# Patient Record
Sex: FEMALE | Race: OTHER | Employment: FULL TIME | URBAN - METROPOLITAN AREA
[De-identification: names, ages, dates, MRNs, and addresses within clinical notes are randomized per-mention and may not be internally consistent; named-entity substitution may affect disease eponyms.]

---

## 2018-01-15 ENCOUNTER — HOSPITAL ENCOUNTER (OUTPATIENT)
Facility: HOSPITAL | Age: 43
Setting detail: OBSERVATION
Discharge: HOME | End: 2018-01-18
Attending: EMERGENCY MEDICINE | Admitting: INTERNAL MEDICINE
Payer: COMMERCIAL

## 2018-01-15 ENCOUNTER — APPOINTMENT (OUTPATIENT)
Dept: CT IMAGING | Facility: HOSPITAL | Age: 43
End: 2018-01-15
Attending: EMERGENCY MEDICINE
Payer: COMMERCIAL

## 2018-01-15 DIAGNOSIS — G45.9 TRANSIENT CEREBRAL ISCHEMIA, UNSPECIFIED TYPE: Primary | ICD-10-CM

## 2018-01-15 LAB
ANION GAP SERPL CALC-SCNC: 6 MMOL/L (ref 0–18)
B-HCG UR QL: NEGATIVE
BASOPHILS # BLD: 0.1 K/UL (ref 0–0.2)
BASOPHILS NFR BLD: 1 %
BILIRUB UR QL: NEGATIVE
BUN SERPL-MCNC: 10 MG/DL (ref 8–20)
BUN/CREAT SERPL: 16.7 (ref 10–20)
CALCIUM SERPL-MCNC: 8.6 MG/DL (ref 8.5–10.5)
CHLORIDE SERPL-SCNC: 106 MMOL/L (ref 95–110)
CLARITY UR: CLEAR
CO2 SERPL-SCNC: 27 MMOL/L (ref 22–32)
COLOR UR: YELLOW
CREAT SERPL-MCNC: 0.6 MG/DL (ref 0.5–1.5)
EOSINOPHIL # BLD: 0.1 K/UL (ref 0–0.7)
EOSINOPHIL NFR BLD: 1 %
ERYTHROCYTE [DISTWIDTH] IN BLOOD BY AUTOMATED COUNT: 13.6 % (ref 11–15)
GLUCOSE SERPL-MCNC: 98 MG/DL (ref 70–99)
GLUCOSE UR-MCNC: NEGATIVE MG/DL
HCT VFR BLD AUTO: 39.1 % (ref 35–48)
HGB BLD-MCNC: 12.9 G/DL (ref 12–16)
HGB UR QL STRIP.AUTO: NEGATIVE
KETONES UR-MCNC: NEGATIVE MG/DL
LEUKOCYTE ESTERASE UR QL STRIP.AUTO: NEGATIVE
LYMPHOCYTES # BLD: 2.7 K/UL (ref 1–4)
LYMPHOCYTES NFR BLD: 35 %
MAGNESIUM SERPL-MCNC: 2.7 MG/DL (ref 1.8–2.5)
MCH RBC QN AUTO: 27.3 PG (ref 27–32)
MCHC RBC AUTO-ENTMCNC: 33 G/DL (ref 32–37)
MCV RBC AUTO: 82.7 FL (ref 80–100)
MONOCYTES # BLD: 0.8 K/UL (ref 0–1)
MONOCYTES NFR BLD: 10 %
NEUTROPHILS # BLD AUTO: 4 K/UL (ref 1.8–7.7)
NEUTROPHILS NFR BLD: 52 %
NITRITE UR QL STRIP.AUTO: NEGATIVE
OSMOLALITY UR CALC.SUM OF ELEC: 287 MOSM/KG (ref 275–295)
PH UR: 6 [PH] (ref 5–8)
PLATELET # BLD AUTO: 313 K/UL (ref 140–400)
PMV BLD AUTO: 7.8 FL (ref 7.4–10.3)
POTASSIUM SERPL-SCNC: 3.7 MMOL/L (ref 3.3–5.1)
PROT UR-MCNC: NEGATIVE MG/DL
RBC # BLD AUTO: 4.73 M/UL (ref 3.7–5.4)
SODIUM SERPL-SCNC: 139 MMOL/L (ref 136–144)
SP GR UR STRIP: 1.02 (ref 1–1.03)
TROPONIN I SERPL-MCNC: 0 NG/ML (ref ?–0.03)
UROBILINOGEN UR STRIP-ACNC: <2
VIT C UR-MCNC: NEGATIVE MG/DL
WBC # BLD AUTO: 7.7 K/UL (ref 4–11)

## 2018-01-15 PROCEDURE — 70450 CT HEAD/BRAIN W/O DYE: CPT | Performed by: EMERGENCY MEDICINE

## 2018-01-15 RX ORDER — ACETAMINOPHEN 325 MG/1
325 TABLET ORAL EVERY 6 HOURS PRN
COMMUNITY

## 2018-01-15 RX ORDER — ASPIRIN 81 MG/1
324 TABLET, CHEWABLE ORAL ONCE
Status: COMPLETED | OUTPATIENT
Start: 2018-01-15 | End: 2018-01-15

## 2018-01-15 NOTE — ED INITIAL ASSESSMENT (HPI)
Pt to ER with c/o right sided headache with \"tingling\" and dizziness since Friday. +blurred vision per patient. Face symmetrical. Boyfriend states she had some \"slurred speech on Saturday\". Pt currently alert and oriented x4. Speech clear in triage.  Pt

## 2018-01-16 ENCOUNTER — APPOINTMENT (OUTPATIENT)
Dept: ULTRASOUND IMAGING | Facility: HOSPITAL | Age: 43
End: 2018-01-16
Attending: INTERNAL MEDICINE
Payer: COMMERCIAL

## 2018-01-16 ENCOUNTER — APPOINTMENT (OUTPATIENT)
Dept: MRI IMAGING | Facility: HOSPITAL | Age: 43
End: 2018-01-16
Attending: Other
Payer: COMMERCIAL

## 2018-01-16 LAB
ANION GAP SERPL CALC-SCNC: 8 MMOL/L (ref 0–18)
BASOPHILS # BLD: 0 K/UL (ref 0–0.2)
BASOPHILS # BLD: 0 K/UL (ref 0–0.2)
BASOPHILS NFR BLD: 0 %
BASOPHILS NFR BLD: 1 %
BUN SERPL-MCNC: 8 MG/DL (ref 8–20)
BUN/CREAT SERPL: 11.6 (ref 10–20)
CALCIUM SERPL-MCNC: 8.6 MG/DL (ref 8.5–10.5)
CHLORIDE SERPL-SCNC: 104 MMOL/L (ref 95–110)
CHOLEST SERPL-MCNC: 191 MG/DL (ref 110–200)
CO2 SERPL-SCNC: 26 MMOL/L (ref 22–32)
CREAT SERPL-MCNC: 0.69 MG/DL (ref 0.5–1.5)
EOSINOPHIL # BLD: 0.1 K/UL (ref 0–0.7)
EOSINOPHIL # BLD: 0.1 K/UL (ref 0–0.7)
EOSINOPHIL NFR BLD: 1 %
EOSINOPHIL NFR BLD: 2 %
ERYTHROCYTE [DISTWIDTH] IN BLOOD BY AUTOMATED COUNT: 13.6 % (ref 11–15)
ERYTHROCYTE [DISTWIDTH] IN BLOOD BY AUTOMATED COUNT: 13.6 % (ref 11–15)
GLUCOSE SERPL-MCNC: 87 MG/DL (ref 70–99)
HCT VFR BLD AUTO: 37.9 % (ref 35–48)
HCT VFR BLD AUTO: 39.1 % (ref 35–48)
HDLC SERPL-MCNC: 44 MG/DL
HGB BLD-MCNC: 12.2 G/DL (ref 12–16)
HGB BLD-MCNC: 12.9 G/DL (ref 12–16)
INR BLD: 1 (ref 0.9–1.2)
LDLC SERPL CALC-MCNC: 126 MG/DL (ref 0–99)
LYMPHOCYTES # BLD: 2.1 K/UL (ref 1–4)
LYMPHOCYTES # BLD: 2.6 K/UL (ref 1–4)
LYMPHOCYTES NFR BLD: 23 %
LYMPHOCYTES NFR BLD: 31 %
MCH RBC QN AUTO: 26.8 PG (ref 27–32)
MCH RBC QN AUTO: 27.4 PG (ref 27–32)
MCHC RBC AUTO-ENTMCNC: 32.3 G/DL (ref 32–37)
MCHC RBC AUTO-ENTMCNC: 33 G/DL (ref 32–37)
MCV RBC AUTO: 83.1 FL (ref 80–100)
MCV RBC AUTO: 83.2 FL (ref 80–100)
MONOCYTES # BLD: 0.7 K/UL (ref 0–1)
MONOCYTES # BLD: 0.8 K/UL (ref 0–1)
MONOCYTES NFR BLD: 10 %
MONOCYTES NFR BLD: 8 %
NEUTROPHILS # BLD AUTO: 5 K/UL (ref 1.8–7.7)
NEUTROPHILS # BLD AUTO: 6.1 K/UL (ref 1.8–7.7)
NEUTROPHILS NFR BLD: 58 %
NEUTROPHILS NFR BLD: 68 %
NONHDLC SERPL-MCNC: 147 MG/DL
OSMOLALITY UR CALC.SUM OF ELEC: 284 MOSM/KG (ref 275–295)
PLATELET # BLD AUTO: 297 K/UL (ref 140–400)
PLATELET # BLD AUTO: 299 K/UL (ref 140–400)
PMV BLD AUTO: 7.7 FL (ref 7.4–10.3)
PMV BLD AUTO: 7.8 FL (ref 7.4–10.3)
POTASSIUM SERPL-SCNC: 3.4 MMOL/L (ref 3.3–5.1)
POTASSIUM SERPL-SCNC: 3.7 MMOL/L (ref 3.3–5.1)
PROTHROMBIN TIME: 13.2 SECONDS (ref 11.8–14.5)
RBC # BLD AUTO: 4.56 M/UL (ref 3.7–5.4)
RBC # BLD AUTO: 4.7 M/UL (ref 3.7–5.4)
SODIUM SERPL-SCNC: 138 MMOL/L (ref 136–144)
TRIGL SERPL-MCNC: 106 MG/DL (ref 1–149)
WBC # BLD AUTO: 8.7 K/UL (ref 4–11)
WBC # BLD AUTO: 8.9 K/UL (ref 4–11)

## 2018-01-16 PROCEDURE — 70544 MR ANGIOGRAPHY HEAD W/O DYE: CPT | Performed by: OTHER

## 2018-01-16 PROCEDURE — 70549 MR ANGIOGRAPH NECK W/O&W/DYE: CPT | Performed by: OTHER

## 2018-01-16 PROCEDURE — 93880 EXTRACRANIAL BILAT STUDY: CPT | Performed by: INTERNAL MEDICINE

## 2018-01-16 PROCEDURE — 70553 MRI BRAIN STEM W/O & W/DYE: CPT | Performed by: OTHER

## 2018-01-16 PROCEDURE — 99244 OFF/OP CNSLTJ NEW/EST MOD 40: CPT | Performed by: OTHER

## 2018-01-16 RX ORDER — POTASSIUM CHLORIDE 20 MEQ/1
40 TABLET, EXTENDED RELEASE ORAL EVERY 4 HOURS
Status: COMPLETED | OUTPATIENT
Start: 2018-01-16 | End: 2018-01-16

## 2018-01-16 RX ORDER — POTASSIUM CHLORIDE 20 MEQ/1
40 TABLET, EXTENDED RELEASE ORAL ONCE
Status: COMPLETED | OUTPATIENT
Start: 2018-01-16 | End: 2018-01-16

## 2018-01-16 RX ORDER — ACETAMINOPHEN 325 MG/1
650 TABLET ORAL EVERY 6 HOURS PRN
Status: DISCONTINUED | OUTPATIENT
Start: 2018-01-16 | End: 2018-01-18

## 2018-01-16 RX ORDER — ASPIRIN 81 MG/1
81 TABLET, CHEWABLE ORAL DAILY
Status: DISCONTINUED | OUTPATIENT
Start: 2018-01-16 | End: 2018-01-18

## 2018-01-16 RX ORDER — HEPARIN SODIUM 5000 [USP'U]/ML
5000 INJECTION, SOLUTION INTRAVENOUS; SUBCUTANEOUS EVERY 12 HOURS SCHEDULED
Status: DISCONTINUED | OUTPATIENT
Start: 2018-01-16 | End: 2018-01-18

## 2018-01-16 NOTE — CONSULTS
Consult dictated. Possible left middle cerebral artery TIA versus complicated migraines. Recommendations– MRI, MR angiogram of the head, neck, 2D echo, coagulopathy workup, lipid panel, 81 mg of aspirin.   At this workup is normal with then proceed with t

## 2018-01-16 NOTE — PLAN OF CARE
NEUROLOGICAL - ADULT    • Achieves stable or improved neurological status Progressing        Patient/Family Goals    • Patient/Family Long Term Goal Progressing    • Patient/Family Short Term Goal Progressing        Vitals stable, oriented x4, neuros intac

## 2018-01-16 NOTE — ED NOTES
Patient reports 2 month history of left-sided facial numbness and headaches that have now subsided. Today the patient complains of right-sided headache, tingling and dizziness with right arm weakness since last Friday.

## 2018-01-16 NOTE — ED PROVIDER NOTES
Patient Seen in: [facility] AND [facility] Emergency Department    History   Patient presents with:  Headache (neurologic)    Stated Complaint: headache since friday, dizzy    HPI    38year old female who has past medical history of migraines but is otherwise h Exam   ED Triage Vitals [01/15/18 1541]  BP: 120/80  Pulse: 75  Resp: 18  Temp: 98.3 °F (36.8 °C)  Temp src: Oral  SpO2: 100 %  O2 Device: None (Room air)    Current:/74   Pulse 64   Temp 98.8 °F (37.1 °C) (Temporal)   Resp 20   Ht 154.9 cm (5' 1\") Normal   EMH POCT PREGNANCY URINE - Normal   CBC WITH DIFFERENTIAL WITH PLATELET    Narrative: The following orders were created for panel order CBC WITH DIFFERENTIAL WITH PLATELET.   Procedure                               Abnormality         Status Patient is out of network, our  was contacted and she did attempt to find patient's PCP at outside hospital, but patient is not eligible for transfer, we will admit her here for further neurologic workup.     Dr [address] - will admit  Dr Jose Maria Adame

## 2018-01-16 NOTE — ED NOTES
Patient is safe to transport to floor per MD. Report given to floor RN, Andria Chinchilla. Transport via cart requested. Cardiac telemetry verified in progress.

## 2018-01-16 NOTE — H&P
[facility]    PATIENT'S NAME: Mary Vasquez PHYSICIAN: Jeremy Crockett MD   PATIENT ACCOUNT#:   [id number]    LOCATION:  [place] [address] RECORD #:   [id number]       YOB: 1975  ADMISSION DATE:       01/15/2018 JVD.  LUNGS:  Good air entry bilaterally. HEART:  S1 and S2 heard normally. ABDOMEN:  Soft, nondistended. Bowel sounds present. EXTREMITIES:  No edema. NEUROLOGIC:  Higher functions are normal.  No focal neurologic deficits.     LABORATORY DATA:  Rev

## 2018-01-16 NOTE — PAYOR COMM NOTE
OBSV STATUS    --------------  ADMISSION REVIEW     Payor: [facility] (NON CONTRACTED IPA)  Subscriber #:  [id number]  Authorization Number: N/A    Admit date: N/A  Admit time: N/A       Admitting Physician: Monica Watson MD  Attending Madhaviy weakness, facial droop, numb/tingling in her extremities, persistent vision change, difficulty swallowing, double to ambulate and, dizziness, abdominal pain, chest pain, shortness of breath.[VR.1]    History reviewed. No pertinent past medical history. time. She has normal strength. She is not disoriented. She displays no tremor. No cranial nerve deficit or sensory deficit. She exhibits normal muscle tone. Coordination normal. GCS eye subscore is 4. GCS verbal subscore is 5. GCS motor subscore is 6.    No intracranial hemorrhage, focal infarct or mass.  Small focus of calcification in the left basal ganglia as discussed above more likely asymmetric basal ganglia physiologic calcification, or possibly dystrophic calcification from a previous parasitic infecti Filed:  1/16/2018 10:37 AM Date of Service:  1/16/2018  8:15 AM Status:  Unsigned Transcription    :  Eddy Milligan MD (Physician)         Misael Guillaume    PATIENT'S NAME: Erlin Murrieta PHYSICIAN: Zahra Delgado MD and alert, not in acute distress. VITAL SIGNS:  Afebrile, blood pressure 120/80, pulse 75. HEENT:  Normocephalic. Pupils reactive to light. NECK:  Supple. No JVD. LUNGS:  Good air entry bilaterally. HEART:  S1 and S2 heard normally.   ABDOMEN:  Soft,

## 2018-01-16 NOTE — CM/SW NOTE
Met with patient at bedside, to inquire name of pcp, pt states does not know her name, patient has only seen pcp x2 and has never been hospitalized.   Pt informed needs admission, patient declines transfer as does not know another hospital.  States has been

## 2018-01-17 ENCOUNTER — APPOINTMENT (OUTPATIENT)
Dept: CV DIAGNOSTICS | Facility: HOSPITAL | Age: 43
End: 2018-01-17
Attending: INTERNAL MEDICINE
Payer: COMMERCIAL

## 2018-01-17 ENCOUNTER — APPOINTMENT (OUTPATIENT)
Dept: CT IMAGING | Facility: HOSPITAL | Age: 43
End: 2018-01-17
Attending: Other
Payer: COMMERCIAL

## 2018-01-17 LAB
ALBUMIN SERPL BCP-MCNC: 3.4 G/DL (ref 3.5–4.8)
ALP SERPL-CCNC: 46 U/L (ref 32–100)
ALT SERPL-CCNC: 15 U/L (ref 14–54)
ANION GAP SERPL CALC-SCNC: 9 MMOL/L (ref 0–18)
APTT PPP: 24.2 SECONDS (ref 23.2–35.3)
AST SERPL-CCNC: 18 U/L (ref 15–41)
B-HCG UR QL: NEGATIVE
BILIRUB DIRECT SERPL-MCNC: 0.1 MG/DL (ref 0–0.2)
BILIRUB SERPL-MCNC: 0.6 MG/DL (ref 0.3–1.2)
BUN SERPL-MCNC: 7 MG/DL (ref 8–20)
BUN/CREAT SERPL: 11.9 (ref 10–20)
CALCIUM SERPL-MCNC: 8.8 MG/DL (ref 8.5–10.5)
CHLORIDE SERPL-SCNC: 103 MMOL/L (ref 95–110)
CO2 SERPL-SCNC: 26 MMOL/L (ref 22–32)
CONFIRM DRVVT: 0.9 S (ref 0–1.1)
CREAT SERPL-MCNC: 0.59 MG/DL (ref 0.5–1.5)
F2 C.20210G>A GENO BLD/T: NORMAL
F5 P.R506Q BLD/T QL: NORMAL
GLUCOSE SERPL-MCNC: 137 MG/DL (ref 70–99)
HBA1C MFR BLD: 5.9 % (ref 4–6)
OSMOLALITY UR CALC.SUM OF ELEC: 286 MOSM/KG (ref 275–295)
POTASSIUM SERPL-SCNC: 4 MMOL/L (ref 3.3–5.1)
POTASSIUM SERPL-SCNC: 4.6 MMOL/L (ref 3.3–5.1)
PROT SERPL-MCNC: 6.7 G/DL (ref 5.9–8.4)
PROTHROMBIN TIME: 13.2 SECONDS (ref 11.8–14.5)
SODIUM SERPL-SCNC: 138 MMOL/L (ref 136–144)

## 2018-01-17 PROCEDURE — 93306 TTE W/DOPPLER COMPLETE: CPT | Performed by: INTERNAL MEDICINE

## 2018-01-17 PROCEDURE — 99226 SUBSEQUENT OBSERVATION CARE: CPT | Performed by: OTHER

## 2018-01-17 RX ORDER — SODIUM CHLORIDE 9 MG/ML
INJECTION, SOLUTION INTRAVENOUS CONTINUOUS
Status: DISCONTINUED | OUTPATIENT
Start: 2018-01-17 | End: 2018-01-18

## 2018-01-17 RX ORDER — ATORVASTATIN CALCIUM 10 MG/1
10 TABLET, FILM COATED ORAL NIGHTLY
Status: DISCONTINUED | OUTPATIENT
Start: 2018-01-17 | End: 2018-01-18

## 2018-01-17 RX ORDER — SODIUM CHLORIDE 0.9 % (FLUSH) 0.9 %
10 SYRINGE (ML) INJECTION AS NEEDED
Status: DISCONTINUED | OUTPATIENT
Start: 2018-01-17 | End: 2018-01-18

## 2018-01-17 NOTE — PROGRESS NOTES
[facility]    Progress Note    Sin Lr Patient Status:  Observation    1975 MRN [id number]   Location [address] [facility] Attending [address], [address] Day # 0 PCP No primary care provider on file.        Subjective 01/16/2018   INR 1.0 01/16/2018   MG 2.7 (H) 01/15/2018   TROP 0.00 01/15/2018       Mra Brain (soa=[id number])    Result Date: 1/16/2018  CONCLUSION:  1. A questionable 2 mm right cavernous carotid artery aneurysm versus artifact related to patient motion.  Kenisha

## 2018-01-17 NOTE — CONSULTS
[facility]    PATIENT'S NAME: Ravi Dennison PHYSICIAN: Mayur Edwards MD   CONSULTING PHYSICIAN: Lila Powell MD   PATIENT ACCOUNT#:   [id number]    LOCATION:  [address] [place] [address] RECORD #:   [id number]       DATE OF BIRTH:  2 to light and accommodation. Optic discs are flat. Visual fields are full. NEUROLOGIC:  Cranial nerves III through VII, IX through XII are normal.  Strength in arms and legs is normal.  Deep tendon reflexes symmetric without Babinski.   Joint position s

## 2018-01-17 NOTE — PLAN OF CARE
NEUROLOGICAL - ADULT    • Achieves stable or improved neurological status Progressing        Patient Centered Care    • Patient preferences are identified and integrated in the patient's plan of care Progressing        Patient/Family Goals    • Patient/Fam

## 2018-01-17 NOTE — PROGRESS NOTES
Discussed trans-esophogeal echocardiogram with the patient and her daughter who translated. The procedure was explained including risks, benefits and alternatives. The patient and her daughter expressed understanding.

## 2018-01-17 NOTE — PROGRESS NOTES
[facility]    Progress Note    Liliana Chandler Patient Status:  Observation    1975 MRN [id number]   Location [address] [facility] Attending [address], [address] Day # 0 PCP No primary care provider on file.        Subjective nocturia  MUSCULOSKELETAL: no joint complaints upper or lower extremities  PSYCHE:no depression or anxiety  NEURO: As in HPI    Results:     Lab Results  Component Value Date   WBC 8.9 01/16/2018   HGB 12.9 01/16/2018   HCT 39.1 01/16/2018    01/16/ Carotid Doppler Bilat - Diag Img (cpt=93880)    Result Date: 1/16/2018  CONCLUSION:  1. Mild atherosclerosis of carotid arteries with minimal intimal thickening bilaterally and minimal soft plaque in the right carotid bulb. No stenosis.  Velocities are in n

## 2018-01-18 ENCOUNTER — APPOINTMENT (OUTPATIENT)
Dept: CT IMAGING | Facility: HOSPITAL | Age: 43
End: 2018-01-18
Attending: Other
Payer: COMMERCIAL

## 2018-01-18 ENCOUNTER — APPOINTMENT (OUTPATIENT)
Dept: INTERVENTIONAL RADIOLOGY/VASCULAR | Facility: HOSPITAL | Age: 43
End: 2018-01-18
Attending: NURSE PRACTITIONER
Payer: COMMERCIAL

## 2018-01-18 ENCOUNTER — APPOINTMENT (OUTPATIENT)
Dept: CV DIAGNOSTICS | Facility: HOSPITAL | Age: 43
End: 2018-01-18
Attending: NURSE PRACTITIONER
Payer: COMMERCIAL

## 2018-01-18 VITALS
DIASTOLIC BLOOD PRESSURE: 66 MMHG | SYSTOLIC BLOOD PRESSURE: 112 MMHG | RESPIRATION RATE: 18 BRPM | TEMPERATURE: 98 F | WEIGHT: 127.5 LBS | OXYGEN SATURATION: 99 % | BODY MASS INDEX: 24.07 KG/M2 | HEIGHT: 61 IN | HEART RATE: 80 BPM

## 2018-01-18 PROCEDURE — 93325 DOPPLER ECHO COLOR FLOW MAPG: CPT | Performed by: NURSE PRACTITIONER

## 2018-01-18 PROCEDURE — 93320 DOPPLER ECHO COMPLETE: CPT | Performed by: NURSE PRACTITIONER

## 2018-01-18 PROCEDURE — B245ZZ4 ULTRASONOGRAPHY OF LEFT HEART, TRANSESOPHAGEAL: ICD-10-PCS | Performed by: INTERNAL MEDICINE

## 2018-01-18 PROCEDURE — 99226 SUBSEQUENT OBSERVATION CARE: CPT | Performed by: OTHER

## 2018-01-18 PROCEDURE — 70496 CT ANGIOGRAPHY HEAD: CPT | Performed by: OTHER

## 2018-01-18 RX ORDER — MIDAZOLAM HYDROCHLORIDE 1 MG/ML
2 INJECTION INTRAMUSCULAR; INTRAVENOUS EVERY 5 MIN PRN
Status: DISCONTINUED | OUTPATIENT
Start: 2018-01-18 | End: 2018-01-18

## 2018-01-18 RX ORDER — TOPIRAMATE 50 MG/1
25 TABLET, FILM COATED ORAL NIGHTLY
Qty: 30 TABLET | Refills: 0 | Status: SHIPPED | OUTPATIENT
Start: 2018-01-18

## 2018-01-18 RX ORDER — ASPIRIN 81 MG/1
81 TABLET, CHEWABLE ORAL DAILY
Qty: 30 TABLET | Refills: 0 | Status: SHIPPED | OUTPATIENT
Start: 2018-01-19

## 2018-01-18 RX ORDER — ATORVASTATIN CALCIUM 10 MG/1
10 TABLET, FILM COATED ORAL NIGHTLY
Qty: 30 TABLET | Refills: 0 | Status: SHIPPED | OUTPATIENT
Start: 2018-01-18

## 2018-01-18 RX ORDER — MIDAZOLAM HYDROCHLORIDE 1 MG/ML
INJECTION INTRAMUSCULAR; INTRAVENOUS
Status: COMPLETED
Start: 2018-01-18 | End: 2018-01-18

## 2018-01-18 NOTE — PRE-SEDATION ASSESSMENT
Pre-Procedure Sedation Assessment    Chief Complaint/Indication for procedure: TIA    History of snoring or sleep or apnea?    No    History of previous problems with anesthesia or sedation  No    Physical Findings:  Neck: nl ROM  CV: Normal  PULM: normal r

## 2018-01-18 NOTE — PROGRESS NOTES
[facility]    Progress Note    Bekah Rico Patient Status:  Observation    1975 MRN [id number]   Location [address] [facility] Attending [address], [address] Day # 0 PCP No primary care provider on file.        Subjective negative  RESPIRATORY: denies shortness of breath, wheezing or cough   CARDIOVASCULAR: denies chest pain or REILLY; no palpitations   GI: denies nausea, vomiting, constipation, diarrhea; no heartburn  GENITAL/: no dysuria, urgency or frequency; no nocturia carotid artery, which corresponds to prior MRA abnormality. This may represent an infundibulum or tiny aneurysm. Followup CT or MR angiography of the head in 6-12 months suggested, if no intervention is planned.  2. Otherwise, unremarkable CT arteriography

## 2018-01-18 NOTE — PLAN OF CARE
NEUROLOGICAL - ADULT    • Achieves stable or improved neurological status Not Progressing        Patient Centered Care    • Patient preferences are identified and integrated in the patient's plan of care Not Progressing        Patient/Family Goals    • Lesly Jaime

## 2018-01-18 NOTE — PROGRESS NOTES
Aamir Tate  [id number]  1/18/18    Post procedure/ recovery hand-off report given to [facility]. Patient's vital signs stable.  Patient awakw and alert no c/o     Dev Frausto RN

## 2018-01-18 NOTE — PROCEDURES
Pre-op: TIA  Post-op: TIA  Procedure: YVETTE  Findings: Normal study. No cardiac source of embolus. Sedation: Versed and Fentanyl, monitored for 15 minutes. EBL: 0 cc  Specimen: none  Complications: none  Condition: stable    Full report to follow.

## 2018-01-19 LAB
AT III ACT/NOR PPP CHRO: 97 % NHP (ref 90–122)
PROT C ACT/NOR PPP: 138 % (ref 67–194)
PROT S ACT/NOR PPP: 87 % (ref 48–153)

## 2018-01-19 NOTE — PROGRESS NOTES
[facility]    Progress Note    Liliana Chandler Patient Status:  Observation    1975 MRN [id number]   Location [address] [facility] Attending [address], [address] Day # 0 PCP No primary care provider on file.        Subjective 01/17/2018   CO2 26 01/17/2018    (H) 01/17/2018   CA 8.8 01/17/2018   ALB 3.4 (L) 01/17/2018   ALKPHO 46 01/17/2018   BILT 0.6 01/17/2018   TP 6.7 01/17/2018   AST 18 01/17/2018   ALT 15 01/17/2018   PTT 24.2 01/16/2018   INR 1.0 01/16/2018   MG 2. Hypercoag profile so far negative   CTA for eval of aneurysm    YVETTE negative    Start statin since LDL is high      Migraine headache   Start topamax 25 mg po Qhs     DVT prophylaxis   SCD boots  Heparin SQ Q12h     D/c home today   F/u in the office in

## 2018-01-20 LAB — MTHFR MUTATION: C.1286A>C: NEGATIVE

## 2018-02-22 NOTE — DISCHARGE SUMMARY
[facility]    PATIENT'S NAME: Mary Vasquez PHYSICIAN: Jeremy Crockett MD   PATIENT ACCOUNT#:   [id number]    LOCATION:  [address] [place] [address] RECORD #:   [id number]       YOB: 1975  ADMISSION DATE:       01/15/2018

## 2020-07-03 ENCOUNTER — HOSPITAL ENCOUNTER (EMERGENCY)
Facility: HOSPITAL | Age: 45
Discharge: STILL ADMITTED OR ADMINISTRATIVE | End: 2020-07-03

## (undated) NOTE — LETTER
[address], [place]  Authorization for Invasive Procedures  1.  I hereby authorize Dr. Milady Pate , my physician and whomever may be designated as the doctor's assistant, to perform the following operation and/or procedure:  Samson Glez performed for the purposes of advancing medicine, science, and/or education, provided my identity is not revealed. If the procedure has been videotaped, the physician/surgeon will obtain the original videotape.  The hospital will not be responsible for stor My signature below affirms that prior to the time of the procedure, I have explained to the patient and/or her legal representative, the risks and benefits involved in the proposed treatment and any reasonable alternative to the proposed treatment.  I have